# Patient Record
Sex: MALE | Race: WHITE | NOT HISPANIC OR LATINO | Employment: FULL TIME | URBAN - METROPOLITAN AREA
[De-identification: names, ages, dates, MRNs, and addresses within clinical notes are randomized per-mention and may not be internally consistent; named-entity substitution may affect disease eponyms.]

---
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
## 2021-11-16 ENCOUNTER — OFFICE VISIT (OUTPATIENT)
Dept: URGENT CARE | Facility: CLINIC | Age: 21
End: 2021-11-16
Payer: COMMERCIAL

## 2021-11-16 VITALS
HEART RATE: 65 BPM | WEIGHT: 124 LBS | BODY MASS INDEX: 20.66 KG/M2 | RESPIRATION RATE: 16 BRPM | SYSTOLIC BLOOD PRESSURE: 110 MMHG | HEIGHT: 65 IN | OXYGEN SATURATION: 96 % | TEMPERATURE: 98.6 F | DIASTOLIC BLOOD PRESSURE: 80 MMHG

## 2021-11-16 DIAGNOSIS — R11.11 NON-INTRACTABLE VOMITING WITHOUT NAUSEA, UNSPECIFIED VOMITING TYPE: ICD-10-CM

## 2021-11-16 PROCEDURE — 99202 OFFICE O/P NEW SF 15 MIN: CPT | Performed by: PHYSICIAN ASSISTANT

## 2021-11-16 ASSESSMENT — ENCOUNTER SYMPTOMS
SINUS PAIN: 0
HEARTBURN: 0
HEADACHES: 0
PALPITATIONS: 0
DIARRHEA: 0
COUGH: 0
CHILLS: 0
MYALGIAS: 0
SHORTNESS OF BREATH: 0
DIAPHORESIS: 0
VOMITING: 0
SORE THROAT: 0
ABDOMINAL PAIN: 0
NAUSEA: 0
FEVER: 0
SPUTUM PRODUCTION: 0
DIZZINESS: 0

## 2021-11-16 NOTE — PROGRESS NOTES
Subjective:   Scott Senior is a 21 y.o. male who presents for Other (clearance to return work)      HPI:  This is a very pleasant 21-year-old male presents to the clinic for return to work clearance.  Patient states 3 days ago he had 1 episode of emesis while at work.  Because this is a potential symptom of COVID-19 they made him go test and require evaluation by provider.  He had a negative COVID-19 test preformed on 11/14/2021.  This was performed at the [facility].  He was not evaluated by provider.  Work is still requiring he is evaluated before he may return.  Currently in clinic the patient is asymptomatic.  He believes his one episode of emesis was due to hunger.  He has not run a fever over the last 3 days.  Denies any congestion, cough, myalgias or chills.  No known direct ill contacts.  Currently not taking any OTC medications for symptoms.      Review of Systems   Constitutional: Negative for chills, diaphoresis, fever and malaise/fatigue.   HENT: Negative for congestion, ear pain, sinus pain and sore throat.    Respiratory: Negative for cough, sputum production and shortness of breath.    Cardiovascular: Negative for chest pain and palpitations.   Gastrointestinal: Negative for abdominal pain, diarrhea, heartburn, nausea and vomiting.   Musculoskeletal: Negative for myalgias.   Skin: Negative for rash.   Neurological: Negative for dizziness and headaches.   Endo/Heme/Allergies: Negative for environmental allergies.       Medications:    • PROZAC PO    Allergies: Penicillins    Problem List: Scott Senior does not have any pertinent problems on file.    Surgical History:  No past surgical history on file.    Past Social Hx: Scott Senior  reports that he has been smoking. He has never used smokeless tobacco. He reports that he does not drink alcohol and does not use drugs.     Past Family Hx:  Scott Senior family history is not on file.     Problem list, medications, and  "allergies reviewed by myself today in Epic.     Objective:     /80   Pulse 65   Temp 37 °C (98.6 °F)   Resp 16   Ht 1.651 m (5' 5\")   Wt 56.2 kg (124 lb)   SpO2 96%   BMI 20.63 kg/m²     Physical Exam  Constitutional:       General: He is not in acute distress.     Appearance: Normal appearance. He is not ill-appearing, toxic-appearing or diaphoretic.   HENT:      Head: Normocephalic and atraumatic.      Right Ear: Tympanic membrane, ear canal and external ear normal.      Left Ear: Tympanic membrane, ear canal and external ear normal.      Nose: Nose normal. No congestion or rhinorrhea.      Mouth/Throat:      Mouth: Mucous membranes are moist.      Pharynx: No oropharyngeal exudate or posterior oropharyngeal erythema.   Eyes:      Conjunctiva/sclera: Conjunctivae normal.   Cardiovascular:      Rate and Rhythm: Normal rate and regular rhythm.      Pulses: Normal pulses.      Heart sounds: Normal heart sounds.   Pulmonary:      Effort: Pulmonary effort is normal.      Breath sounds: Normal breath sounds. No wheezing.   Musculoskeletal:      Cervical back: Normal range of motion. No muscular tenderness.   Lymphadenopathy:      Cervical: No cervical adenopathy.   Skin:     General: Skin is warm and dry.      Capillary Refill: Capillary refill takes less than 2 seconds.   Neurological:      Mental Status: He is alert.   Psychiatric:         Mood and Affect: Mood normal.         Thought Content: Thought content normal.           Assessment/Plan:     Comments/MDM:     • Patient was seen and evaluated in clinic today.  Vital signs are stable and within normal limits.  All physical exam findings within normal limits.  Reviewed is negative COVID-19 test from 11/14/2021 performed at the [facility].  Work note with clearance provided.  Encouraged to call with any questions or concerns.     Diagnosis and associated orders:     1. Non-intractable vomiting without nausea, unspecified vomiting type      "         Differential diagnosis, natural history, supportive care, and indications for immediate follow-up discussed.    Advised the patient to follow-up with the primary care physician for recheck, reevaluation, and consideration of further management.    Please note that this dictation was created using voice recognition software. I have made reasonable attempt to correct obvious errors, but I expect that there are errors of grammar and possibly content that I did not discover before finalizing the note.    This note was electronically signed by ZAIN Arriola PA-C

## 2021-11-16 NOTE — LETTER
[facility] [place] [id number] [facility] [address]     November 16, 2021    Patient: Scott Senior   YOB: 2000   Date of Visit: 11/16/2021       To Whom It May Concern:    Scott Senior was seen and treated in our department on 11/16/2021.  Patient evaluated and all physical exam findings within normal limits.  Vital signs stable and nonconcerning.  Currently asymptomatic.  Had a negative COVID-19 test on 11/14/2021.  I have no concern for COVID-19 at this time.  Do not believe his previous symptoms to be related to COVID-19.  Cleared to return to work on 11/17/2021.  Call with questions or concerns at [phone].    Sincerely,     Ruben Arriola P.A.-C.